# Patient Record
Sex: FEMALE
[De-identification: names, ages, dates, MRNs, and addresses within clinical notes are randomized per-mention and may not be internally consistent; named-entity substitution may affect disease eponyms.]

---

## 2020-08-07 PROBLEM — Z00.00 ENCOUNTER FOR PREVENTIVE HEALTH EXAMINATION: Status: ACTIVE | Noted: 2020-08-07

## 2020-08-10 ENCOUNTER — APPOINTMENT (OUTPATIENT)
Dept: SURGERY | Facility: CLINIC | Age: 70
End: 2020-08-10
Payer: MEDICARE

## 2020-08-10 VITALS
HEIGHT: 58 IN | BODY MASS INDEX: 27.5 KG/M2 | TEMPERATURE: 96.5 F | DIASTOLIC BLOOD PRESSURE: 80 MMHG | WEIGHT: 131 LBS | SYSTOLIC BLOOD PRESSURE: 132 MMHG | HEART RATE: 98 BPM

## 2020-08-10 DIAGNOSIS — I25.2 OLD MYOCARDIAL INFARCTION: ICD-10-CM

## 2020-08-10 DIAGNOSIS — Z86.39 PERSONAL HISTORY OF OTHER ENDOCRINE, NUTRITIONAL AND METABOLIC DISEASE: ICD-10-CM

## 2020-08-10 DIAGNOSIS — R89.7 ABNORMAL HISTOLOGICAL FINDINGS IN SPECIMENS FROM OTHER ORGANS, SYSTEMS AND TISSUES: ICD-10-CM

## 2020-08-10 DIAGNOSIS — Z78.9 OTHER SPECIFIED HEALTH STATUS: ICD-10-CM

## 2020-08-10 DIAGNOSIS — R73.03 PREDIABETES.: ICD-10-CM

## 2020-08-10 DIAGNOSIS — N64.9 DISORDER OF BREAST, UNSPECIFIED: ICD-10-CM

## 2020-08-10 DIAGNOSIS — Z86.79 PERSONAL HISTORY OF OTHER DISEASES OF THE CIRCULATORY SYSTEM: ICD-10-CM

## 2020-08-10 DIAGNOSIS — Z87.19 PERSONAL HISTORY OF OTHER DISEASES OF THE DIGESTIVE SYSTEM: ICD-10-CM

## 2020-08-10 PROCEDURE — 99203 OFFICE O/P NEW LOW 30 MIN: CPT

## 2020-08-10 RX ORDER — HYDROCHLOROTHIAZIDE 25 MG/1
25 TABLET ORAL
Refills: 0 | Status: ACTIVE | COMMUNITY

## 2020-08-10 RX ORDER — SIMVASTATIN 20 MG/1
20 TABLET, FILM COATED ORAL
Refills: 0 | Status: ACTIVE | COMMUNITY

## 2020-08-10 RX ORDER — LOSARTAN POTASSIUM 100 MG/1
100 TABLET, FILM COATED ORAL
Refills: 0 | Status: ACTIVE | COMMUNITY

## 2020-08-10 RX ORDER — ASPIRIN 81 MG/1
81 TABLET ORAL
Refills: 0 | Status: ACTIVE | COMMUNITY

## 2020-08-10 RX ORDER — NIFEDIPINE 20 MG/1
CAPSULE ORAL
Refills: 0 | Status: ACTIVE | COMMUNITY

## 2020-08-10 NOTE — HISTORY OF PRESENT ILLNESS
[de-identified] : NIHARIKA MONTANO presents to the office for initial consultation visit, for breast exam. Patient recently had screening breast US and mammo, 07/30/20; imaging results showed BI RADS cat 4 finding w recommendation for fine-needle aspiration of enlarging cluster of cysts with slightly irregular margins in the left breast at 3 o'clock 7 cm from the nipple. \par Patient without any personal or family hx of breast CA. No previous biopsies reported. \par Previous surgical hx includes triple bypass cardiac surgery, at age 53, on ASA 81 mg. \par

## 2020-08-10 NOTE — ASSESSMENT
[FreeTextEntry1] : 70 y.o F presents for breast exam for abnormal finding on recent breast imaging. Clinically, unable to palpable masses or lumps to either breast. Has L. nipple retraction, which patient reports she's always had. \par Imaging reviewed, with enlarging cluster of cysts with slightly irregular margins in the left breast at 3 o'clock 7 cm from the nipple seen; BI RADS cat 4; biopsy recommended.

## 2020-08-10 NOTE — DATA REVIEWED
[FreeTextEntry1] : Patient: NIHARIKA MONTANO\par YOB: 1950\par Date of Exam: 07-\par  \par EXAM:  DIGITAL BILATERAL SCREENING MAMMOGRAM WITH CAD AND TOMOSYNTHESIS AND BREAST ULTRASOUND\par \par HISTORY:  The patient is 70 years old and is seen for a screening mammogram. Family history of breast cancer: None. \par \par CLINICAL BREAST EXAMINATION:  The patient reports that her last clinical breast exam was within the past year. \par \par COMPARISON:  The present examination has been compared to prior breast imaging studies dating back to 5/12/2017.\par \par MAMMOGRAM:\par \par FINDINGS: \par BREAST COMPOSITION:  There are scattered areas of fibroglandular density.\par \par No suspicious masses, architectural distortion, or significant calcifications are detected. Scattered morphologically benign appearing and vascular calcifications are present in both breasts. \par \par BREAST ULTRASOUND:\par \par HISTORY:  Supplemental screening evaluation of dense breasts.\par \par FINDINGS: \par \par Well-circumscribed nonvascular echogenic masses compatible with lipomas are noted in the right breast at 12 o'clock 12 cm nipple measuring 1.8 x 0.8 x 1.6 cm, 1 o'clock 12 cm nipple measuring 7 x 3 x 7 mm, 2 o'clock 3 cm nipple measuring 7 x 3 x 7 mm, 11 o'clock 4 cm nipple measuring 9 x 6 x 9 mm. In the left breast there is one at 7 o'clock 5 cm nipple measuring 1.1 x 0.6 x 1 cm.\par \par There is a cluster of cysts in the left breast at 3 o'clock 7 cm nipple measuring 6 x 6 x 5 mm. It does have somewhat irregular margins. It is also increased in size from prior study when it measured 3 x 3 x 3 mm. Recommend fine-needle aspiration. \par \par IMPRESSION:  \par \par 1. No mammographic evidence of malignancy.\par 2. Recommend fine-needle aspiration of enlarging cluster of cysts with slightly irregular margins in the left breast at 3 o'clock 7 cm from the nipple.\par \par Critical care results team was activated at 1:19 PM on 8/2/2020. This report was also faxed to Dr. Tee St. \par \par FOLLOW-UP:  Fine needle aspiration.\par ASSESSMENT:  BI-RADS Category 4:  Suspicious.

## 2020-08-10 NOTE — REVIEW OF SYSTEMS
[Negative] : Heme/Lymph [Chest Pain] : no chest pain [FreeTextEntry5] : hx of triple bypass surgery S/P MI, age 53

## 2020-08-10 NOTE — PHYSICAL EXAM
[Normal Breath Sounds] : Normal breath sounds [Normal Rate and Rhythm] : normal rate and rhythm [No Rash or Lesion] : No rash or lesion [Alert] : alert [Oriented to Person] : oriented to person [Oriented to Place] : oriented to place [Oriented to Time] : oriented to time [Calm] : calm [JVD] : no jugular venous distention  [de-identified] : EOMI; sclera anicteric. Nasal mucosa pink, septum midline. Oral mucosa pink. Tongue midline, Pharynx without exudates. [de-identified] : A/Ox3; NAD. appears comfortable [de-identified] : retracted L. nipple (patient states she has always had this); no masses or lumps felt to either side, no nipple discharge, no tenderness, no axillary adenopathy  [de-identified] : +ROM, no joint swelling [de-identified] : abd is soft, NT/ND\par

## 2020-08-10 NOTE — REASON FOR VISIT
[Consultation] : a consultation visit [Other: _____] : [unfilled] [FreeTextEntry1] : breast exam /abnormal finding on breast imaging

## 2020-08-10 NOTE — CONSULT LETTER
[Dear  ___] : Dear  [unfilled], [Consult Letter:] : I had the pleasure of evaluating your patient, [unfilled]. [Consult Closing:] : Thank you very much for allowing me to participate in the care of this patient.  If you have any questions, please do not hesitate to contact me. [Sincerely,] : Sincerely, [FreeTextEntry3] : Naveen Guerra MD\par

## 2020-08-10 NOTE — PLAN
[FreeTextEntry1] : Ms. NIHARIKA MONTANO was informed of significance of findings. All the options, risks and benefits were explained. \par Referred for needle biopsy of suspicious finding in the left breast. Will call to schedule for the procedure. Scheduled for tomorrow\par \par \par Patient's daughter was present during the visit and involved in the discussion. \par Patient's questions and concerns addressed to their satisfaction, and they verbalized an understanding of the information discussed.\par \par \par \par

## 2020-08-11 ENCOUNTER — RESULT REVIEW (OUTPATIENT)
Age: 70
End: 2020-08-11

## 2020-10-13 ENCOUNTER — TRANSCRIPTION ENCOUNTER (OUTPATIENT)
Age: 70
End: 2020-10-13